# Patient Record
Sex: FEMALE | Race: WHITE | NOT HISPANIC OR LATINO | URBAN - METROPOLITAN AREA
[De-identification: names, ages, dates, MRNs, and addresses within clinical notes are randomized per-mention and may not be internally consistent; named-entity substitution may affect disease eponyms.]

---

## 2017-10-20 ENCOUNTER — IMPORTED ENCOUNTER (OUTPATIENT)
Dept: URBAN - METROPOLITAN AREA CLINIC 31 | Facility: CLINIC | Age: 72
End: 2017-10-20

## 2017-10-20 PROBLEM — H43.392: Noted: 2017-10-20

## 2017-10-20 PROBLEM — H43.812: Noted: 2017-10-20

## 2017-10-20 PROBLEM — H25.813: Noted: 2017-10-20

## 2017-10-20 PROCEDURE — 92250 FUNDUS PHOTOGRAPHY W/I&R: CPT

## 2017-10-20 PROCEDURE — 92015 DETERMINE REFRACTIVE STATE: CPT

## 2017-10-20 PROCEDURE — 92004 COMPRE OPH EXAM NEW PT 1/>: CPT

## 2017-10-20 NOTE — PATIENT DISCUSSION
1. Combined Types of Cataract OU: Explained how cataracts can effect vision. Recommend clinical observation. The patient was advised to contact us if any change or worsening of vision. 2. PVD OS: Patient was cautioned to call our office immediately if they experience a substantial change in their symptoms such as an increase in floaters persistent flashes loss of visual field (may appear as a shadow or a curtain) or decrease in visual acuity as these may indicate a retinal tear or detachment. If this is a new problem patient will need to return for re-examination  as determined by the [address] Foaters OS:  Patient was cautioned to call our office immediately if they experience a substantial change in their symptoms such as an increase in floaters persistent flashes loss of visual field (may appear as a shadow or a curtain) or decrease in visual acuity as these may indicate a retinal tear or detachment. If this is a new problem patient will need to return for re-examination  as determined by the [address]. Refractive error5. Glasses for PRN use. Understands cataract will limit BCVA. 6.  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 Advised of RD signs and symptoms to call. 7. Return for an appointment in 8 months for comprehensive exam. Fundus Photo with Dr. Jalen Le.

## 2018-07-23 ENCOUNTER — IMPORTED ENCOUNTER (OUTPATIENT)
Dept: URBAN - METROPOLITAN AREA CLINIC 31 | Facility: CLINIC | Age: 73
End: 2018-07-23

## 2018-07-23 PROBLEM — H25.813: Noted: 2018-07-23

## 2018-07-23 PROCEDURE — 92014 COMPRE OPH EXAM EST PT 1/>: CPT

## 2018-07-23 PROCEDURE — 92015 DETERMINE REFRACTIVE STATE: CPT

## 2018-07-23 NOTE — PATIENT DISCUSSION
Discussed the risks benefits alternatives and limitations of cataract surgery including infection bleeding loss of vision retinal tears detachment. DISCUSSED WITH THE PATIENT. The patient stated a full understanding and a desire to proceed with the procedure in both eyes. schedule LEFT EYE FIRST THEN RIGHT.

## 2018-07-30 NOTE — PATIENT DISCUSSION
Continue FML BID until gone.  Still some evidence of GPC, but improving.  Continue DW use.  Pt had been EW prior.

## 2022-01-14 ENCOUNTER — IMPORTED ENCOUNTER (OUTPATIENT)
Dept: URBAN - METROPOLITAN AREA CLINIC 31 | Facility: CLINIC | Age: 77
End: 2022-01-14

## 2022-01-14 PROBLEM — H59.811: Noted: 2022-01-14

## 2022-01-14 PROBLEM — H25.813: Noted: 2022-01-14

## 2022-01-14 PROBLEM — H43.813: Noted: 2022-01-14

## 2022-01-14 PROCEDURE — 99204 OFFICE O/P NEW MOD 45 MIN: CPT

## 2022-01-14 NOTE — PATIENT DISCUSSION
1.  Discussed the risks benefits alternatives and limitations of cataract surgery including infection bleeding loss of vision retinal tears detachment. The patient stated a full understanding and a desire to proceed with the procedure in both eyes. Refractive options were reviewed. Patient has elected to be optimized for distance vision in both eyes. The patient will still need glasses for reading and to possibly fine tune distance vision. Schedule KPE/IOL OD/osPt interested in [employer]. may need toric2. PVD OU:  Patient was cautioned to call our office immediately if they experience a substantial change in their symptoms such as an increase in floaters persistent flashes loss of visual field (may appear as a shadow or a curtain) or decrease in visual acuity as these may indicate a retinal tear or detachment. If this is a new problem patient will need to return for re-examination  as determined by the [address] H/O RD OD s/p repair - stableRD sx reviewed pt to call if new sx. 4. Return for an appointment for [address] [place] [address] with Dr. [facility].

## 2022-02-04 ENCOUNTER — IMPORTED ENCOUNTER (OUTPATIENT)
Dept: URBAN - METROPOLITAN AREA CLINIC 31 | Facility: CLINIC | Age: 77
End: 2022-02-04

## 2022-02-04 PROBLEM — H25.813: Noted: 2022-02-04

## 2022-02-04 PROCEDURE — 92025 CPTRIZED CORNEAL TOPOGRAPHY: CPT

## 2022-02-04 PROCEDURE — 92136 OPHTHALMIC BIOMETRY: CPT

## 2022-02-04 PROCEDURE — 76519 ECHO EXAM OF EYE: CPT

## 2022-02-04 PROCEDURE — 92286 ANT SGM IMG I&R SPECLR MIC: CPT

## 2022-02-04 PROCEDURE — 92134 CPTRZ OPH DX IMG PST SGM RTA: CPT

## 2022-02-04 NOTE — PATIENT DISCUSSION
Discussed the risks benefits alternatives and limitations of cataract surgery including infection bleeding loss of vision retinal tears detachment. The patient stated a full understanding and a desire to proceed with the procedure in both eyes. Refractive options were reviewed. Pt has elected MFIOL/EDOF with Femto assist and ORA guidance to optimize lens power choice. The pt may still need glasses to possibly fine tune uncorrected vision. The patient understands the risk of glare and halo at night.

## 2022-03-01 ENCOUNTER — IMPORTED ENCOUNTER (OUTPATIENT)
Dept: URBAN - METROPOLITAN AREA CLINIC 31 | Facility: CLINIC | Age: 77
End: 2022-03-01

## 2022-03-01 PROBLEM — Z96.1: Noted: 2022-03-01

## 2022-03-01 PROCEDURE — 99024 POSTOP FOLLOW-UP VISIT: CPT

## 2022-03-01 NOTE — PATIENT DISCUSSION
Post-Op Day #1 - Cataract Surgery Right Eye (OD) - doing well. IOP elevated comfortable. combigan instilled. Tears prn. Continue postop drops as directed. Call office with symptoms of pain redness or decreased vision in operative eye. 1 drop tobramycin instilled. ok to proceed with fellow eye as scheduled

## 2022-03-08 ENCOUNTER — IMPORTED ENCOUNTER (OUTPATIENT)
Dept: URBAN - METROPOLITAN AREA CLINIC 31 | Facility: CLINIC | Age: 77
End: 2022-03-08

## 2022-03-08 PROBLEM — Z96.1: Noted: 2022-03-08

## 2022-03-08 PROCEDURE — 99024 POSTOP FOLLOW-UP VISIT: CPT

## 2022-03-08 NOTE — PATIENT DISCUSSION
1.  Post-Op Day #1 - Cataract Surgery Left Eye (OS) - doing well. Tears prn. Continue postop drops as directed. Call office with symptoms of pain redness or decreased vision in operative eye.  1 drop of tobramycin instilled2. Post-Op Week #1 - Cataract Surgery Right Eye (OD) - Intraocular lens stable and surgery very well healed. Patient to resume all normal activities. Finish postop drops as directed. Final Refraction given if necessary. Call with any problems. 3. Return for an appointment in 1 week for post op refraction. with Dr. Christofer Ribeiro.

## 2022-03-16 ENCOUNTER — IMPORTED ENCOUNTER (OUTPATIENT)
Dept: URBAN - METROPOLITAN AREA CLINIC 31 | Facility: CLINIC | Age: 77
End: 2022-03-16

## 2022-03-16 PROBLEM — Z96.1: Noted: 2022-03-16

## 2022-03-16 PROCEDURE — 99024 POSTOP FOLLOW-UP VISIT: CPT

## 2022-03-16 NOTE — PATIENT DISCUSSION
1.  Post-Op Day #1 - Cataract Surgery Left Eye (OS) - doing well. Tears prn. Continue postop drops as directed. Call office with symptoms of pain redness or decreased vision in operative eye.  1 drop of tobramycin instilled2. Post-Op Cataract Surgery 15-90 days Right Eye (OD):  Doing well with stable vision. Monitor for changes3. Return for an appointment in 6 weeks for post op exam. with Dr. Paddy Gunter.

## 2022-04-02 ASSESSMENT — VISUAL ACUITY
OD_CC: 20/30+1
OD_CC: 20/80+1
OD_SC: 20/40
OS_GLARE: 20/50MED
OS_SC: 20/30+2
OS_CC: 20/40-2
OD_SC: 20/30+2
OS_SC: 20/50
OS_CC: 20/30
OD_GLARE: 20/60-2MED
OD_CC: 20/40
OS_CC: J1+
OD_CC: 20/20
OS_CC: 20/30+1
OD_CC: 20/25+2
OS_PH: SC 20/70
OS_SC: 20/20
OS_CC: 20/100-2
OD_CC: 20/30-2
OD_PH: SC 20/20
OS_SC: J16
OD_CC: J1+
OS_CC: 20/30-2
OD_SC: 20/30+1
OD_SC: J3
OD_GLARE: 20/60MED
OS_GLARE: 20/70-1MED
OS_CC: 20/40-2
OU_SC: 20/40

## 2022-04-02 ASSESSMENT — TONOMETRY
OS_IOP_MMHG: 14
OD_IOP_MMHG: 19
OD_IOP_MMHG: 29
OS_IOP_MMHG: 21
OD_IOP_MMHG: 13
OS_IOP_MMHG: 13
OS_IOP_MMHG: 16
OS_IOP_MMHG: 18
OD_IOP_MMHG: 15
OS_IOP_MMHG: 13
OD_IOP_MMHG: 13
OD_IOP_MMHG: 15

## 2022-05-06 ENCOUNTER — POST-OP (OUTPATIENT)
Dept: URBAN - METROPOLITAN AREA CLINIC 29 | Facility: CLINIC | Age: 77
End: 2022-05-06

## 2022-05-06 DIAGNOSIS — H04.209: ICD-10-CM

## 2022-05-06 PROCEDURE — 68801 DILATE TEAR DUCT OPENING: CPT

## 2022-05-06 PROCEDURE — 99213 OFFICE O/P EST LOW 20 MIN: CPT

## 2022-05-06 ASSESSMENT — VISUAL ACUITY
OS_SC: 20/30-1
OD_SC: 20/40

## 2022-05-06 ASSESSMENT — TONOMETRY
OS_IOP_MMHG: 12
OD_IOP_MMHG: 12

## 2022-05-06 NOTE — PROCEDURE NOTE: CLINICAL
PROCEDURE NOTE: Dilation of Lacrimal Punctum, With or Without Irrigation Bilateral Lower Lids. Diagnosis: Epiphora. Anesthesia: Topical. Prior to treatment, the risks/benefits/alternatives were discussed. The patient wished to proceed with procedure. A time out to confirm the patient, site, and procedure has been achieved. The site has been marked. The punctum was dilated with a punctum dilator. The nasolacrimal duct was then dilated. A syringe and cannula were used to irrigate water into the canaliculus. There was free flow into nose- therefore no NLDO. Patient tolerated procedure well. There were no complications. Post op instructions given. Frances Sinha

## 2022-05-06 NOTE — PATIENT DISCUSSION
Probing and irrigation performed without difficulty initial resisitance then normal flow Bilateral lower NLD.

## 2022-05-06 NOTE — PATIENT DISCUSSION
We discussed causes for epiphora including, but not limited to, outflow obstruction and increased production (e.g. reflex tearing).

## 2022-07-09 ENCOUNTER — TELEPHONE ENCOUNTER (OUTPATIENT)
Dept: URBAN - METROPOLITAN AREA CLINIC 121 | Facility: CLINIC | Age: 77
End: 2022-07-09

## 2022-07-10 ENCOUNTER — TELEPHONE ENCOUNTER (OUTPATIENT)
Dept: URBAN - METROPOLITAN AREA CLINIC 121 | Facility: CLINIC | Age: 77
End: 2022-07-10

## 2022-07-10 RX ORDER — LEVOTHYROXINE SODIUM 50 UG/1
TABLET ORAL
Refills: 0 | Status: ACTIVE | COMMUNITY
Start: 2009-01-23

## 2022-07-10 RX ORDER — AMLODIPINE BESYLATE 5 MG/1
TABLET ORAL
Refills: 0 | Status: ACTIVE | COMMUNITY
Start: 2009-01-23

## 2022-07-10 RX ORDER — CHROMIUM 200 MCG
TABLET ORAL
Refills: 0 | Status: ACTIVE | COMMUNITY
Start: 2009-01-23

## 2022-07-10 RX ORDER — FUROSEMIDE 40 MG/1
TABLET ORAL
Refills: 0 | Status: ACTIVE | COMMUNITY
Start: 2009-01-23

## 2022-07-10 RX ORDER — VITAM B12 100 MCG
TAB ORAL
Refills: 0 | Status: ACTIVE | COMMUNITY
Start: 2009-01-23

## 2022-09-09 ENCOUNTER — ESTABLISHED PATIENT (OUTPATIENT)
Dept: URBAN - METROPOLITAN AREA CLINIC 29 | Facility: CLINIC | Age: 77
End: 2022-09-09

## 2022-09-09 DIAGNOSIS — H43.813: ICD-10-CM

## 2022-09-09 DIAGNOSIS — Z96.1: ICD-10-CM

## 2022-09-09 DIAGNOSIS — H04.203: ICD-10-CM

## 2022-09-09 DIAGNOSIS — H31.091: ICD-10-CM

## 2022-09-09 PROCEDURE — 68801 DILATE TEAR DUCT OPENING: CPT

## 2022-09-09 PROCEDURE — 99214 OFFICE O/P EST MOD 30 MIN: CPT

## 2022-09-09 ASSESSMENT — VISUAL ACUITY
OD_SC: 20/30
OS_SC: 20/20
OS_SC: 20/30-2
OD_SC: 20/20

## 2022-09-09 ASSESSMENT — TONOMETRY
OS_IOP_MMHG: 9
OD_IOP_MMHG: 9

## 2022-09-09 NOTE — PATIENT DISCUSSION
Better OS but OD is worse.  We discussed causes for epiphora including, but not limited to, outflow obstruction and increased production (e.g. reflex tearing).

## 2022-09-09 NOTE — PATIENT DISCUSSION
Probing and irrigation performed without difficulty initial resisitance then normal flow right side.

## 2023-09-13 ENCOUNTER — COMPREHENSIVE EXAM (OUTPATIENT)
Dept: URBAN - METROPOLITAN AREA CLINIC 29 | Facility: CLINIC | Age: 78
End: 2023-09-13

## 2023-09-13 DIAGNOSIS — H04.203: ICD-10-CM

## 2023-09-13 DIAGNOSIS — H31.091: ICD-10-CM

## 2023-09-13 DIAGNOSIS — H43.813: ICD-10-CM

## 2023-09-13 PROCEDURE — 92014 COMPRE OPH EXAM EST PT 1/>: CPT

## 2023-09-13 ASSESSMENT — VISUAL ACUITY
OS_SC: 20/25-3
OU_SC: 20/30
OD_SC: 20/20-2

## 2023-09-13 ASSESSMENT — TONOMETRY
OS_IOP_MMHG: 12
OD_IOP_MMHG: 11

## 2024-10-22 ENCOUNTER — COMPREHENSIVE EXAM (OUTPATIENT)
Dept: URBAN - METROPOLITAN AREA CLINIC 29 | Facility: CLINIC | Age: 79
End: 2024-10-22

## 2024-10-22 DIAGNOSIS — H43.813: ICD-10-CM

## 2024-10-22 DIAGNOSIS — H31.091: ICD-10-CM

## 2024-10-22 DIAGNOSIS — Z96.1: ICD-10-CM

## 2024-10-22 DIAGNOSIS — Z98.890: ICD-10-CM

## 2024-10-22 PROCEDURE — 99214 OFFICE O/P EST MOD 30 MIN: CPT
